# Patient Record
Sex: FEMALE | URBAN - METROPOLITAN AREA
[De-identification: names, ages, dates, MRNs, and addresses within clinical notes are randomized per-mention and may not be internally consistent; named-entity substitution may affect disease eponyms.]

---

## 2024-04-12 LAB — CRC RECOMMENDATION EXT: NORMAL

## 2024-05-20 ENCOUNTER — PATIENT OUTREACH (OUTPATIENT)
Dept: ADMINISTRATIVE | Facility: HOSPITAL | Age: 59
End: 2024-05-20

## 2024-05-20 NOTE — PROGRESS NOTES
CMS/MSSP Non-compliant report chart audits for BREAST CANCER SCREENING/COLON CANCER SCREENING. Chart review completed for HM test overdue (mammograms, Colonoscopies, pap smears, DM labs, and/or EYE EXAMs)      Care Everywhere and media, updates requested and reviewed.      Last mammogram 3/14/2013, due for screening, Hyperlinked Cscope.    Health Maintenance Due   Topic Date Due    Cervical Cancer Screening  Never done    HIV Screening  Never done    Colorectal Cancer Screening  Never done    Mammogram  03/14/2014    COVID-19 Vaccine (3 - 2023-24 season) 09/01/2023